# Patient Record
Sex: FEMALE | URBAN - METROPOLITAN AREA
[De-identification: names, ages, dates, MRNs, and addresses within clinical notes are randomized per-mention and may not be internally consistent; named-entity substitution may affect disease eponyms.]

---

## 2021-06-18 ENCOUNTER — OFFICE VISIT (OUTPATIENT)
Dept: URBAN - METROPOLITAN AREA CLINIC 59 | Facility: CLINIC | Age: 86
End: 2021-06-18
Payer: MEDICARE

## 2021-06-18 DIAGNOSIS — H35.54 VITELLIFORM RETINAL DYSTROPHY: Primary | ICD-10-CM

## 2021-06-18 DIAGNOSIS — H26.492 OTHER SECONDARY CATARACT, LEFT EYE: ICD-10-CM

## 2021-06-18 DIAGNOSIS — Z96.1 PRESENCE OF INTRAOCULAR LENS: ICD-10-CM

## 2021-06-18 DIAGNOSIS — H16.223 KERATOCONJUNCTIVITIS SICCA, BILATERAL: ICD-10-CM

## 2021-06-18 DIAGNOSIS — H52.4 PRESBYOPIA: ICD-10-CM

## 2021-06-18 PROCEDURE — 92134 CPTRZ OPH DX IMG PST SGM RTA: CPT | Performed by: OPTOMETRIST

## 2021-06-18 PROCEDURE — 92004 COMPRE OPH EXAM NEW PT 1/>: CPT | Performed by: OPTOMETRIST

## 2021-06-18 ASSESSMENT — INTRAOCULAR PRESSURE
OS: 13
OD: 11

## 2021-06-18 ASSESSMENT — VISUAL ACUITY
OS: 20/40
OD: 20/50

## 2021-06-18 NOTE — IMPRESSION/PLAN
Impression: Keratoconjunctivitis sicca, bilateral: Q60.402. Plan: Patient to begin artificial tears OU.

## 2021-06-18 NOTE — IMPRESSION/PLAN
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 Plan: Discussed condition with patient and daughter. Patient to continue PreserVision. Patient should check amsler grid every couple days with correction. OCT performed today. Patient should return to office if any sudden changes in vision, otherwise yearly exams. 

MAC OCT: central PED no SRF OU

## 2022-08-01 ENCOUNTER — OFFICE VISIT (OUTPATIENT)
Dept: URBAN - METROPOLITAN AREA CLINIC 59 | Facility: CLINIC | Age: 87
End: 2022-08-01
Payer: MEDICARE

## 2022-08-01 DIAGNOSIS — Z96.1 PRESENCE OF INTRAOCULAR LENS: ICD-10-CM

## 2022-08-01 DIAGNOSIS — H52.4 PRESBYOPIA: ICD-10-CM

## 2022-08-01 DIAGNOSIS — H16.223 KERATOCONJUNCTIVITIS SICCA, BILATERAL: ICD-10-CM

## 2022-08-01 DIAGNOSIS — H26.492 OTHER SECONDARY CATARACT, LEFT EYE: ICD-10-CM

## 2022-08-01 DIAGNOSIS — H35.54 DYSTROPHIES PRIMARILY INVOLVING THE RETINAL PIGMENT EPITHELIUM: Primary | ICD-10-CM

## 2022-08-01 PROCEDURE — 92014 COMPRE OPH EXAM EST PT 1/>: CPT | Performed by: OPTOMETRIST

## 2022-08-01 PROCEDURE — 92134 CPTRZ OPH DX IMG PST SGM RTA: CPT | Performed by: OPTOMETRIST

## 2022-08-01 RX ORDER — PROPYLENE GLYCOL 0.06 MG/ML
0.6 % SOLUTION/ DROPS OPHTHALMIC
Qty: 0 | Refills: 0 | Status: ACTIVE
Start: 2022-08-01

## 2022-08-01 ASSESSMENT — INTRAOCULAR PRESSURE
OS: 14
OD: 15

## 2022-08-01 ASSESSMENT — VISUAL ACUITY
OD: 20/50
OS: 20/40

## 2022-08-01 NOTE — IMPRESSION/PLAN
Impression: Dystrophies primarily involving the retinal pigment epithelium: H35.54. Plan: MAC OCT was done today. Patient educated on findings. Will continue to monitor yearly.  

MAC OCT: PED/no SRF OU-no significant changes OU

## 2022-08-01 NOTE — IMPRESSION/PLAN
Impression: Keratoconjunctivitis sicca, bilateral: P36.440. Plan: Patient to begin artificial tears 2-3 times a day OU. Dry eye hand out and sample of Systane Complete was given today.

## 2023-08-03 ENCOUNTER — OFFICE VISIT (OUTPATIENT)
Dept: URBAN - METROPOLITAN AREA CLINIC 59 | Facility: CLINIC | Age: 88
End: 2023-08-03
Payer: MEDICARE

## 2023-08-03 DIAGNOSIS — H26.492 OTHER SECONDARY CATARACT, LEFT EYE: ICD-10-CM

## 2023-08-03 DIAGNOSIS — H35.54 VITELLIFORM RETINAL DYSTROPHY: Primary | ICD-10-CM

## 2023-08-03 DIAGNOSIS — H16.223 KERATOCONJUNCTIVITIS SICCA, BILATERAL: ICD-10-CM

## 2023-08-03 DIAGNOSIS — Z96.1 PRESENCE OF INTRAOCULAR LENS: ICD-10-CM

## 2023-08-03 PROCEDURE — 92134 CPTRZ OPH DX IMG PST SGM RTA: CPT | Performed by: OPTOMETRIST

## 2023-08-03 PROCEDURE — 99214 OFFICE O/P EST MOD 30 MIN: CPT | Performed by: OPTOMETRIST

## 2023-08-03 ASSESSMENT — INTRAOCULAR PRESSURE
OS: 13
OD: 11